# Patient Record
Sex: FEMALE | Race: ASIAN | NOT HISPANIC OR LATINO
[De-identification: names, ages, dates, MRNs, and addresses within clinical notes are randomized per-mention and may not be internally consistent; named-entity substitution may affect disease eponyms.]

---

## 2021-03-18 PROBLEM — Z00.00 ENCOUNTER FOR PREVENTIVE HEALTH EXAMINATION: Status: ACTIVE | Noted: 2021-03-18

## 2021-03-19 ENCOUNTER — APPOINTMENT (OUTPATIENT)
Dept: COLORECTAL SURGERY | Facility: CLINIC | Age: 66
End: 2021-03-19
Payer: MEDICAID

## 2021-03-19 VITALS
SYSTOLIC BLOOD PRESSURE: 153 MMHG | TEMPERATURE: 98.4 F | HEART RATE: 96 BPM | BODY MASS INDEX: 23.16 KG/M2 | HEIGHT: 60 IN | WEIGHT: 118 LBS | DIASTOLIC BLOOD PRESSURE: 89 MMHG

## 2021-03-19 PROCEDURE — 99072 ADDL SUPL MATRL&STAF TM PHE: CPT

## 2021-03-19 PROCEDURE — 99204 OFFICE O/P NEW MOD 45 MIN: CPT

## 2021-03-19 RX ORDER — METOPROLOL SUCCINATE 25 MG/1
25 TABLET, EXTENDED RELEASE ORAL
Refills: 0 | Status: ACTIVE | COMMUNITY

## 2021-03-19 RX ORDER — ASPIRIN 81 MG
81 TABLET, DELAYED RELEASE (ENTERIC COATED) ORAL
Refills: 0 | Status: ACTIVE | COMMUNITY

## 2021-03-19 RX ORDER — TELMISARTAN 80 MG/1
80 TABLET ORAL
Refills: 0 | Status: ACTIVE | COMMUNITY

## 2021-03-19 RX ORDER — OMEPRAZOLE 40 MG/1
40 CAPSULE, DELAYED RELEASE ORAL
Refills: 0 | Status: ACTIVE | COMMUNITY

## 2021-03-19 RX ORDER — METFORMIN HYDROCHLORIDE 500 MG/1
500 TABLET, COATED ORAL
Refills: 0 | Status: ACTIVE | COMMUNITY

## 2021-03-19 NOTE — PHYSICAL EXAM
[Abdomen Masses] : No abdominal masses [Abdomen Tenderness] : ~T No ~M abdominal tenderness [No HSM] : no hepatosplenomegaly [JVD] : no jugular venous distention  [Normal Heart Sounds] : normal heart sounds [No Rash or Lesion] : No rash or lesion [Alert] : alert

## 2021-03-19 NOTE — ASSESSMENT
[FreeTextEntry1] : Chinese  used.\par \par I have advised the patient and her daughter the findings on examination and CT scan are consistent with a mucocele of the appendix. Recommend laparoscopic appendectomy and partial cecal resection. The risks benefits and alternatives were reviewed including but not limited to infection, bleeding, underlying malignancy requiring secondary procedures including right hemicolectomy. All questions answered. Instructions reviewed. Surgery scheduled.

## 2021-03-19 NOTE — HISTORY OF PRESENT ILLNESS
[FreeTextEntry1] : 64 yo F Jono/Jeff presents w/ niece for evaluation of appendiceal lesion\par PMH HTN, DMII (on metformin)\par hx of positive FOBT w/ PCP, referred to GI for initial colonoscopy\par \par Initial colonoscopy completed 2/17/21 with Dr. Lamas\par - internal hemorrhoids\par - appendiceal submucosal lesion \par \par CT Abdomen 3/12/21\par - there is patient motion artifact which significantly limits this examination\par - there is a peculiar appearance in the cecal region with a tubular structure measuring up to 2.6 cm in maximal cross-sectional diameter. R/o appendiceal mucocele/hydrops.\par - there are no periappendiceal inflammatory changes. There is no evidence of an appendicolith\par - there is atherosclerosis\par \par Patient denies fever, unintentional weight loss, abdominal pain, n/v or change in BMs\par c/o rectal itching for the last year, denies pain or BPR\par Denies use of OTC creams\par BH: 3-4 times daily, reports first 1-2 BMs normal, followed by looser BMs\par Reports adequate dietary fiber and water intake\par Takes ASA 81 mg daily

## 2021-03-21 DIAGNOSIS — Z01.818 ENCOUNTER FOR OTHER PREPROCEDURAL EXAMINATION: ICD-10-CM

## 2021-03-22 ENCOUNTER — APPOINTMENT (OUTPATIENT)
Dept: DISASTER EMERGENCY | Facility: CLINIC | Age: 66
End: 2021-03-22

## 2021-03-23 LAB — SARS-COV-2 N GENE NPH QL NAA+PROBE: NOT DETECTED

## 2021-03-24 ENCOUNTER — TRANSCRIPTION ENCOUNTER (OUTPATIENT)
Age: 66
End: 2021-03-24

## 2021-03-25 ENCOUNTER — TRANSCRIPTION ENCOUNTER (OUTPATIENT)
Age: 66
End: 2021-03-25

## 2021-03-25 ENCOUNTER — OUTPATIENT (OUTPATIENT)
Dept: OUTPATIENT SERVICES | Facility: HOSPITAL | Age: 66
LOS: 1 days | Discharge: HOME | End: 2021-03-25
Payer: COMMERCIAL

## 2021-03-25 ENCOUNTER — RESULT REVIEW (OUTPATIENT)
Age: 66
End: 2021-03-25

## 2021-03-25 ENCOUNTER — APPOINTMENT (OUTPATIENT)
Dept: COLORECTAL SURGERY | Facility: HOSPITAL | Age: 66
End: 2021-03-25

## 2021-03-25 VITALS
WEIGHT: 115.3 LBS | TEMPERATURE: 97 F | OXYGEN SATURATION: 97 % | HEART RATE: 100 BPM | DIASTOLIC BLOOD PRESSURE: 80 MMHG | SYSTOLIC BLOOD PRESSURE: 115 MMHG | HEIGHT: 60 IN | RESPIRATION RATE: 18 BRPM

## 2021-03-25 VITALS — SYSTOLIC BLOOD PRESSURE: 151 MMHG | OXYGEN SATURATION: 100 % | HEART RATE: 88 BPM | DIASTOLIC BLOOD PRESSURE: 77 MMHG

## 2021-03-25 LAB
BLD GP AB SCN SERPL QL: NEGATIVE — SIGNIFICANT CHANGE UP
RH IG SCN BLD-IMP: POSITIVE — SIGNIFICANT CHANGE UP

## 2021-03-25 PROCEDURE — 88307 TISSUE EXAM BY PATHOLOGIST: CPT | Mod: 26

## 2021-03-25 PROCEDURE — C1889: CPT

## 2021-03-25 PROCEDURE — 86901 BLOOD TYPING SEROLOGIC RH(D): CPT

## 2021-03-25 PROCEDURE — 86900 BLOOD TYPING SEROLOGIC ABO: CPT

## 2021-03-25 PROCEDURE — 86850 RBC ANTIBODY SCREEN: CPT

## 2021-03-25 PROCEDURE — 82962 GLUCOSE BLOOD TEST: CPT

## 2021-03-25 PROCEDURE — 44970 LAPAROSCOPY APPENDECTOMY: CPT | Mod: GC

## 2021-03-25 PROCEDURE — 44970 LAPAROSCOPY APPENDECTOMY: CPT

## 2021-03-25 PROCEDURE — 88307 TISSUE EXAM BY PATHOLOGIST: CPT

## 2021-03-25 RX ORDER — ACETAMINOPHEN 500 MG
1000 TABLET ORAL EVERY 6 HOURS
Refills: 0 | Status: DISCONTINUED | OUTPATIENT
Start: 2021-03-25 | End: 2021-03-25

## 2021-03-25 RX ORDER — DOCUSATE SODIUM 100 MG
1 CAPSULE ORAL
Qty: 14 | Refills: 0
Start: 2021-03-25 | End: 2021-03-31

## 2021-03-25 RX ORDER — ACETAMINOPHEN 500 MG
1000 TABLET ORAL ONCE
Refills: 0 | Status: COMPLETED | OUTPATIENT
Start: 2021-03-25 | End: 2021-03-25

## 2021-03-25 RX ORDER — HYDROMORPHONE HYDROCHLORIDE 2 MG/ML
0.5 INJECTION INTRAMUSCULAR; INTRAVENOUS; SUBCUTANEOUS EVERY 4 HOURS
Refills: 0 | Status: DISCONTINUED | OUTPATIENT
Start: 2021-03-25 | End: 2021-03-25

## 2021-03-25 RX ORDER — HEPARIN SODIUM 5000 [USP'U]/ML
5000 INJECTION INTRAVENOUS; SUBCUTANEOUS ONCE
Refills: 0 | Status: COMPLETED | OUTPATIENT
Start: 2021-03-25 | End: 2021-03-25

## 2021-03-25 RX ORDER — GABAPENTIN 400 MG/1
600 CAPSULE ORAL ONCE
Refills: 0 | Status: COMPLETED | OUTPATIENT
Start: 2021-03-25 | End: 2021-03-25

## 2021-03-25 RX ORDER — OXYCODONE HYDROCHLORIDE 5 MG/1
1 TABLET ORAL
Qty: 10 | Refills: 0
Start: 2021-03-25

## 2021-03-25 RX ORDER — ONDANSETRON 8 MG/1
4 TABLET, FILM COATED ORAL EVERY 6 HOURS
Refills: 0 | Status: DISCONTINUED | OUTPATIENT
Start: 2021-03-25 | End: 2021-03-25

## 2021-03-25 RX ORDER — BUPIVACAINE 13.3 MG/ML
20 INJECTION, SUSPENSION, LIPOSOMAL INFILTRATION ONCE
Refills: 0 | Status: DISCONTINUED | OUTPATIENT
Start: 2021-03-25 | End: 2021-03-25

## 2021-03-25 RX ORDER — SODIUM CHLORIDE 9 MG/ML
1000 INJECTION, SOLUTION INTRAVENOUS
Refills: 0 | Status: DISCONTINUED | OUTPATIENT
Start: 2021-03-25 | End: 2021-03-25

## 2021-03-25 RX ORDER — KETOROLAC TROMETHAMINE 30 MG/ML
15 SYRINGE (ML) INJECTION EVERY 6 HOURS
Refills: 0 | Status: DISCONTINUED | OUTPATIENT
Start: 2021-03-25 | End: 2021-03-25

## 2021-03-25 RX ADMIN — GABAPENTIN 600 MILLIGRAM(S): 400 CAPSULE ORAL at 11:45

## 2021-03-25 RX ADMIN — Medication 1000 MILLIGRAM(S): at 11:44

## 2021-03-25 RX ADMIN — HEPARIN SODIUM 5000 UNIT(S): 5000 INJECTION INTRAVENOUS; SUBCUTANEOUS at 11:45

## 2021-03-25 NOTE — BRIEF OPERATIVE NOTE - OPERATION/FINDINGS
Peritoneum accessed via infraumbilical Elizabeth cutdown. Additional 5mm ports x2 placed in LLQ. TAP block performed. Large mass noted in the RLQ without obstruction and gangrene Peritoneum accessed via infraumbilical Elizabeth cutdown. Additional 5mm ports x2 placed in LLQ. TAP block performed. Large appendiceal mass noted in the RLQ without obstruction and gangrene with dense adhesions to right abdominal. Adhesion dissected bluntly and with ligasure. Mass noted to extend into cecum. Appendical mesentery ligated using ligasure. Cecum transected using EndoGIA purple load x2. Appendix removed from abdomen in EndoCatch. Fascia closed with 2-0 vicryl figure of eight x3. Skin closed with 4-0 monocryl and dermabond.

## 2021-03-25 NOTE — PACU DISCHARGE NOTE - COMMENTS
pt was able to uriante 75ml at this time. pt has voided 175ml in total. team notified and team said pt is good to be discharge. went over discharge paper with patient used  phone ([employer] #[id number]). family member notified. wheeled pt down to lobby pt left at [phone]

## 2021-03-30 LAB — SURGICAL PATHOLOGY STUDY: SIGNIFICANT CHANGE UP

## 2021-03-31 PROBLEM — K38.9 DISEASE OF APPENDIX, UNSPECIFIED: Chronic | Status: ACTIVE | Noted: 2021-03-24

## 2021-03-31 PROBLEM — I10 ESSENTIAL (PRIMARY) HYPERTENSION: Chronic | Status: ACTIVE | Noted: 2021-03-24

## 2021-03-31 PROBLEM — E11.9 TYPE 2 DIABETES MELLITUS WITHOUT COMPLICATIONS: Chronic | Status: ACTIVE | Noted: 2021-03-24

## 2021-04-03 ENCOUNTER — NON-APPOINTMENT (OUTPATIENT)
Age: 66
End: 2021-04-03

## 2021-04-06 PROBLEM — Z92.89 HISTORY OF BLOOD TRANSFUSION: Status: RESOLVED | Noted: 2021-04-06 | Resolved: 2021-04-06

## 2021-04-06 PROBLEM — E78.00 HIGH CHOLESTEROL: Status: ACTIVE | Noted: 2021-04-06

## 2021-04-06 PROBLEM — E11.9 DIABETES: Status: ACTIVE | Noted: 2021-04-06

## 2021-04-06 PROBLEM — I10 HBP (HIGH BLOOD PRESSURE): Status: ACTIVE | Noted: 2021-04-06

## 2021-04-06 PROBLEM — Z72.3 DOES NOT EXERCISE: Status: ACTIVE | Noted: 2021-04-06

## 2021-04-07 ENCOUNTER — APPOINTMENT (OUTPATIENT)
Dept: COLORECTAL SURGERY | Facility: CLINIC | Age: 66
End: 2021-04-07
Payer: MEDICAID

## 2021-04-07 VITALS
DIASTOLIC BLOOD PRESSURE: 92 MMHG | TEMPERATURE: 97.5 F | SYSTOLIC BLOOD PRESSURE: 155 MMHG | HEIGHT: 60 IN | HEART RATE: 104 BPM | BODY MASS INDEX: 20.42 KG/M2 | WEIGHT: 104 LBS

## 2021-04-07 DIAGNOSIS — I10 ESSENTIAL (PRIMARY) HYPERTENSION: ICD-10-CM

## 2021-04-07 DIAGNOSIS — Z72.3 LACK OF PHYSICAL EXERCISE: ICD-10-CM

## 2021-04-07 DIAGNOSIS — K38.8 OTHER SPECIFIED DISEASES OF APPENDIX: ICD-10-CM

## 2021-04-07 DIAGNOSIS — Z92.89 PERSONAL HISTORY OF OTHER MEDICAL TREATMENT: ICD-10-CM

## 2021-04-07 DIAGNOSIS — E11.9 TYPE 2 DIABETES MELLITUS W/OUT COMPLICATIONS: ICD-10-CM

## 2021-04-07 DIAGNOSIS — E78.00 PURE HYPERCHOLESTEROLEMIA, UNSPECIFIED: ICD-10-CM

## 2021-04-07 PROCEDURE — 99024 POSTOP FOLLOW-UP VISIT: CPT

## 2021-04-07 NOTE — HISTORY OF PRESENT ILLNESS
[FreeTextEntry1] : 64 y/o Mandarin/Fuzhounese speaking F presents for f/u appendiceal lesion\par s/p lap appendectomy on 3/25/21\par \par Surgical Final Report\par \par Final Diagnosis\par Appendix and cecum, resection:\par - Low-grade appendiceal mucinous neoplasm (LAMN)\par - Resection margin negative for LAMN\par - See synoptic report below\par - See note\par \par Note: Acellular extravasated mucin is present in the appendix\par wall (muscularis propria). Mucin does not extend\par beyond the muscularis propria. The entire specimen is submitted\par for microscopic evaluation.\par \par APPENDIX: Resection\par SPECIMEN\par Procedure: Appendectomy with portion of cecum\par TUMOR\par Tumor Site: Diffusely involving appendix\par Histologic Type: Low-grade appendiceal mucinous neoplasm\par Histologic Grade: Low-grade\par Tumor Size: Cannot be determined - LAMN diffusely involving\par appendix\par Tumor Deposits: Not identified\par Tumor Extension: Confined to  lumen surface ( no invasion)\par Lymphovascular Invasion: Not identified\par Perineural Invasion: Not identified\par MARGINS\par Status of Mucinous Neoplasm at Proximal Margin: Uninvolved by\par appendiceal mucinous neoplasm\par Status of Mucinous Neoplasm at Mesenteric Margin: Uninvolved by\par appendiceal mucinous neoplasm\par LYMPH NODES\par Regional Lymph Nodes: No lymph nodes submitted or found\par TNM Descriptors: Not applicable\par \par Reports she is recovering well from surgery. C/o mild abdominal pain LUE pain that are both improving since surgery \par Denies fever or chills\par Appetite and energy levels are adequate\par Denies issues with BM's \par

## 2021-04-07 NOTE — ASSESSMENT
[FreeTextEntry1] : Reviewed pathology-  LAMN of appendix. Negative margins\par \par Recovered well.\par \par Advised surveillance colonoscopy in 1 year.\par \par No adjuvant treatment required.\par

## 2021-04-07 NOTE — PHYSICAL EXAM
[Abdomen Masses] : No abdominal masses [Abdomen Tenderness] : ~T No ~M abdominal tenderness [No HSM] : no hepatosplenomegaly [JVD] : no jugular venous distention  [Normal Heart Sounds] : normal heart sounds [No Rash or Lesion] : No rash or lesion [Alert] : alert [de-identified] : incisions well healed